# Patient Record
Sex: FEMALE | Race: BLACK OR AFRICAN AMERICAN | NOT HISPANIC OR LATINO | URBAN - METROPOLITAN AREA
[De-identification: names, ages, dates, MRNs, and addresses within clinical notes are randomized per-mention and may not be internally consistent; named-entity substitution may affect disease eponyms.]

---

## 2021-05-31 DIAGNOSIS — Z87.442 PERSONAL HISTORY OF URINARY CALCULI: Primary | ICD-10-CM

## 2021-12-15 ENCOUNTER — OFFICE VISIT (OUTPATIENT)
Dept: UROLOGY | Facility: CLINIC | Age: 41
End: 2021-12-15
Payer: MEDICAID

## 2021-12-15 VITALS
WEIGHT: 167 LBS | BODY MASS INDEX: 27.82 KG/M2 | SYSTOLIC BLOOD PRESSURE: 116 MMHG | HEART RATE: 118 BPM | DIASTOLIC BLOOD PRESSURE: 82 MMHG | RESPIRATION RATE: 18 BRPM | HEIGHT: 65 IN

## 2021-12-15 DIAGNOSIS — R39.15 URINARY URGENCY: ICD-10-CM

## 2021-12-15 DIAGNOSIS — Z87.442 PERSONAL HISTORY OF URINARY CALCULI: Primary | ICD-10-CM

## 2021-12-15 DIAGNOSIS — T83.84XA PAIN DUE TO URETERAL STENT, INITIAL ENCOUNTER: ICD-10-CM

## 2021-12-15 DIAGNOSIS — R82.90 ABNORMAL URINALYSIS: ICD-10-CM

## 2021-12-15 DIAGNOSIS — R10.9 ABDOMINAL PAIN, UNSPECIFIED ABDOMINAL LOCATION: ICD-10-CM

## 2021-12-15 DIAGNOSIS — R10.9 LEFT FLANK PAIN: ICD-10-CM

## 2021-12-15 PROCEDURE — 99204 PR OFFICE/OUTPT VISIT, NEW, LEVL IV, 45-59 MIN: ICD-10-PCS | Mod: S$GLB,,, | Performed by: NURSE PRACTITIONER

## 2021-12-15 PROCEDURE — 99204 OFFICE O/P NEW MOD 45 MIN: CPT | Mod: S$GLB,,, | Performed by: NURSE PRACTITIONER

## 2021-12-15 RX ORDER — CIPROFLOXACIN 500 MG/1
500 TABLET ORAL 2 TIMES DAILY
Qty: 14 TABLET | Refills: 0 | Status: SHIPPED | OUTPATIENT
Start: 2021-12-15 | End: 2021-12-22

## 2021-12-18 LAB — URINE CULTURE, ROUTINE: NORMAL

## 2021-12-20 ENCOUNTER — TELEPHONE (OUTPATIENT)
Dept: UROLOGY | Facility: CLINIC | Age: 41
End: 2021-12-20
Payer: MEDICAID

## 2022-01-06 ENCOUNTER — TELEPHONE (OUTPATIENT)
Dept: UROLOGY | Facility: CLINIC | Age: 42
End: 2022-01-06
Payer: MEDICAID

## 2022-01-06 NOTE — TELEPHONE ENCOUNTER
----- Message from Mitchell Edwards NP sent at 1/6/2022 11:36 AM CST -----  CT shows 12 mm stone in left renal pelvis and double J ureteral stent on the left.    Discussed with Dr. Hightower.   Will schedule patient for left URS with stent removal and replacement.  Dr. Hightower would like to do surgery on 01/19/2022.  Please notify patient.  If patient is agreeable, she will need an order for surgery and a pre-op appointment.

## 2022-01-11 ENCOUNTER — CLINICAL SUPPORT (OUTPATIENT)
Dept: UROLOGY | Facility: CLINIC | Age: 42
End: 2022-01-11
Payer: MEDICAID

## 2022-01-11 DIAGNOSIS — N20.0 KIDNEY STONE: Primary | ICD-10-CM

## 2022-01-12 ENCOUNTER — TELEPHONE (OUTPATIENT)
Dept: UROLOGY | Facility: CLINIC | Age: 42
End: 2022-01-12
Payer: MEDICAID

## 2022-01-17 LAB
ANION GAP SERPL CALC-SCNC: 12 MMOL/L (ref 3–11)
APPEARANCE, UA: CLEAR
B-HCG UR QL: NEGATIVE
BACTERIA SPEC CULT: ABNORMAL /HPF
BASOPHILS NFR BLD: 0.7 % (ref 0–3)
BILIRUB UR QL STRIP: NEGATIVE MG/DL
BUN SERPL-MCNC: 7 MG/DL (ref 7–18)
BUN/CREAT SERPL: 6.66 RATIO (ref 7–18)
CALCIUM SERPL-MCNC: 9.9 MG/DL (ref 8.8–10.5)
CHLORIDE SERPL-SCNC: 102 MMOL/L (ref 100–108)
CO2 SERPL-SCNC: 25 MMOL/L (ref 21–32)
COLOR UR: ABNORMAL
CREAT SERPL-MCNC: 1.05 MG/DL (ref 0.55–1.02)
EOSINOPHIL NFR BLD: 0.4 % (ref 1–3)
ERYTHROCYTE [DISTWIDTH] IN BLOOD BY AUTOMATED COUNT: 13.1 % (ref 12.5–18)
GFR ESTIMATION: > 60
GLUCOSE (UA): NORMAL MG/DL
GLUCOSE SERPL-MCNC: 88 MG/DL (ref 70–110)
HCT VFR BLD AUTO: 42.9 % (ref 37–47)
HGB BLD-MCNC: 14.7 G/DL (ref 12–16)
HGB UR QL STRIP: 150 /UL
KETONES UR QL STRIP: NEGATIVE MG/DL
LEUKOCYTE ESTERASE UR QL STRIP: 500 /UL
LYMPHOCYTES NFR BLD: 27.5 % (ref 25–40)
MCH RBC QN AUTO: 28.1 PG (ref 27–31.2)
MCHC RBC AUTO-ENTMCNC: 34.3 G/DL (ref 31.8–35.4)
MCV RBC AUTO: 81.9 FL (ref 80–97)
MONOCYTES NFR BLD: 7.8 % (ref 1–15)
NEUTROPHILS # BLD AUTO: 5.16 10*3/UL (ref 1.8–7.7)
NEUTROPHILS NFR BLD: 63.2 % (ref 37–80)
NITRITE UR QL STRIP: POSITIVE
NUCLEATED RED BLOOD CELLS: 0 %
PH UR STRIP: 6.5 PH (ref 5–9)
PLATELETS: 330 10*3/UL (ref 142–424)
POTASSIUM SERPL-SCNC: 4.1 MMOL/L (ref 3.6–5.2)
PROT UR QL STRIP: 30 MG/DL
RBC # BLD AUTO: 5.24 10*6/UL (ref 4.2–5.4)
RBC #/AREA URNS HPF: ABNORMAL /HPF (ref 0–2)
SERVICE COMMENT 03: ABNORMAL
SODIUM BLD-SCNC: 139 MMOL/L (ref 135–145)
SP GR UR STRIP: 1 (ref 1–1.03)
SPECIMEN COLLECTION METHOD, URINE: ABNORMAL
SQUAMOUS EPITHELIAL, UA: ABNORMAL /LPF
UROBILINOGEN UR STRIP-ACNC: NORMAL MG/DL
WBC # BLD: 8.2 10*3/UL (ref 4.6–10.2)
WBC #/AREA URNS HPF: ABNORMAL /HPF (ref 0–5)

## 2022-01-18 LAB
STREPTOCOCCUS AGALACTIAE: NORMAL
URINE CULTURE, ROUTINE: NORMAL
URINE CULTURE, ROUTINE: NORMAL

## 2022-01-19 ENCOUNTER — OUTSIDE PLACE OF SERVICE (OUTPATIENT)
Dept: UROLOGY | Facility: CLINIC | Age: 42
End: 2022-01-19

## 2022-01-19 PROCEDURE — 74420 PR  X-RAY RETROGRADE PYELOGRAM: ICD-10-PCS | Mod: 26,,, | Performed by: UROLOGY

## 2022-01-19 PROCEDURE — 52356 CYSTO/URETERO W/LITHOTRIPSY: CPT | Mod: LT,,, | Performed by: UROLOGY

## 2022-01-19 PROCEDURE — 74420 UROGRAPHY RTRGR +-KUB: CPT | Mod: 26,,, | Performed by: UROLOGY

## 2022-01-19 PROCEDURE — 52356 PR CYSTO/URETERO W/LITHOTRIPSY: ICD-10-PCS | Mod: LT,,, | Performed by: UROLOGY

## 2022-01-20 DIAGNOSIS — N20.0 KIDNEY STONE: Primary | ICD-10-CM

## 2022-01-23 LAB
CALCULI COMPOSITION: NORMAL
CALCULI DESCRIPTION: NORMAL
CALCULI MASS: 96 MG
CALCULI PDF: NORMAL

## 2022-02-01 ENCOUNTER — TELEPHONE (OUTPATIENT)
Dept: UROLOGY | Facility: CLINIC | Age: 42
End: 2022-02-01
Payer: MEDICAID

## 2022-02-01 NOTE — TELEPHONE ENCOUNTER
----- Message from Elana Chow sent at 2/1/2022  9:11 AM CST -----  Regarding: stent came out  Type:  Needs Medical Advice    Who Called: Gianna Cordero    Symptoms (please be specific): -   How long has patient had these symptoms:  -  Pharmacy name and phone #:  -  Would the patient rather a call back or a response via [facility]?    Best Call Back Number: [phone]    Additional Information: pt  had a stent placed on 1/19, due to have it removed on 2/8, pt is saying that the tubng and screen had come out please call to advise

## 2022-02-01 NOTE — TELEPHONE ENCOUNTER
Patient stated that the stent and string came out, discussed case with dr rodríguez, he stated this was fine, he left the string, and that to f/u PRN. [facility]

## 2022-02-07 ENCOUNTER — TELEPHONE (OUTPATIENT)
Dept: UROLOGY | Facility: CLINIC | Age: 42
End: 2022-02-07
Payer: MEDICAID

## 2022-02-08 ENCOUNTER — PROCEDURE VISIT (OUTPATIENT)
Dept: UROLOGY | Facility: CLINIC | Age: 42
End: 2022-02-08
Payer: MEDICAID

## 2022-02-08 VITALS
WEIGHT: 177.19 LBS | BODY MASS INDEX: 29.52 KG/M2 | SYSTOLIC BLOOD PRESSURE: 112 MMHG | DIASTOLIC BLOOD PRESSURE: 74 MMHG | HEIGHT: 65 IN

## 2022-02-08 DIAGNOSIS — N20.0 KIDNEY STONE: ICD-10-CM

## 2022-02-08 PROCEDURE — 52000 CYSTOSCOPY: ICD-10-PCS | Mod: S$GLB,,, | Performed by: UROLOGY

## 2022-02-08 PROCEDURE — 52000 CYSTOURETHROSCOPY: CPT | Mod: S$GLB,,, | Performed by: UROLOGY

## 2022-02-08 NOTE — PROCEDURES
"Cystoscopy    Date/Time: 2/8/2022 11:30 AM  Performed by: Ricky Hightower MD  Authorized by: Ricky Hightower MD     Consent Done?:  Yes (Written)  Time out: Immediately prior to procedure a "time out" was called to verify the correct patient, procedure, equipment, support staff and site/side marked as required.    Indications: hematuria    Position:  Supine  Anesthesia:  Intraurethral instillation  Patient sedated?: No    Preparation: Patient was prepped and draped in usual sterile fashion      Scope type:  Flexible cystoscope  External exam normal: Yes    Urethra normal: Yes       The patient was brought to the procedure room placed on the table padded prepped and draped in usual sterile fashion in supine position. The cystoscope was inserted into the urethra and advanced the urethra was normal. The bladder was entered and inspected, it was found to be free of tumor stone or foreign body.  Bilateral ureteral orifices were identified and noted to be normal in appearance with clear efflux of urine at this point the scope was removed the patient tolerated the procedure well there were no complications.  Pelvic exam was performed and no abnormalities were noted.    The previously placed stent had passed on its own after asking the patient this it past several days ago therefore there was no stent to remove      "

## 2022-03-11 ENCOUNTER — TELEPHONE (OUTPATIENT)
Dept: UROLOGY | Facility: CLINIC | Age: 42
End: 2022-03-11
Payer: MEDICAID

## 2022-03-11 NOTE — TELEPHONE ENCOUNTER
I have faxed over office notes to [facility] office.    ----- Message from Denise Coelho sent at 3/11/2022  1:45 PM CST -----  Caitlin from [facility] Paniagua's office needs pt notes fax to [phone]. // [phone]

## 2022-05-29 NOTE — PATIENT INSTRUCTIONS
Patient Education       Cystoscopy Discharge Instructions   About this topic   Your kidneys make urine. It is stored in your bladder. The urethra is a tube at the bottom of the bladder. Urine flows out of this tube. Sometimes, there is a blockage and urine is not able to leave the body.  A cystoscopy is a procedure that lets the doctor see the inside of your bladder and urethra. The doctor does it to:  · Look for stones or tumors blocking the bladder and urethra  · Look for changes or injury inside the bladder  · Take a tissue sample from the inside of your bladder  · Look for reasons for blood in the urine, pain with urination, or why you are passing urine often  · Look for prostate problems     What care is needed at home?   · Ask your doctor what you need to do when you go home. Make sure you ask questions if you do not understand what the doctor says. This way you will know what you need to do.  · Take a warm bath or use a warm wet washcloth over the opening to the urethra. This will help to ease any pain. Do this as needed.  · Drink 6 to 8 glasses of water a day and 3 to 4 glasses in the first few hours after the procedure to flush out your bladder and reduce irritation.  · You may see some blood in your urine for a few days. This is normal.  · Empty your bladder as soon as you feel the need to. Don't delay going to the bathroom. It stretches and weakens the bladder.  What follow-up care is needed?   · Your doctor may ask you to make visits to the office to check on your progress. Be sure to keep these visits.  · If you had a biopsy, talk with your doctor about the results.  What drugs may be needed?   The doctor may order drugs to:  · Help with pain  · Fight an infection  · Help with bladder spasms  Will physical activity be limited?   Talk to your doctor about when you may go back to your normal activities like work, driving, or sex.  What problems could happen?   · Bleeding  · Infection  · Injury to the  Statement Selected bladder and urethra  · Discomfort in the urethra area  · Burning sensation for a short time  · Upset stomach  When do I need to call the doctor?   · Signs of infection. These include a fever of 100.4°F (38°C) or higher, chills, pain with passing urine.  · Pain that does not go away even with drugs or that lasts longer than 2 days  · Too much blood in your urine  · Passing large dime-sized clots  · Cloudy urine  · Little or no urine or not able to pass urine  · Abdominal pain and nausea  Teach Back: Helping You Understand   The Teach Back Method helps you understand the information we are giving you. After you talk with the staff, tell them in your own words what you learned. This helps to make sure the staff has described each thing clearly. It also helps to explain things that may have been confusing. Before going home, make sure you can do these:  · I can tell you about my procedure.  · I can tell you what may help ease my pain.  · I can tell you what I will do if I have a fever, chills, or am not able to pass urine.  Where can I learn more?   [employer] Society  https://www.cancer.org/treatment/understanding-your-diagnosis/tests/endoscopy/cystoscopy.html   [employer]  https://www.cancerresearchuk.org/about-cancer/bladder-cancer/getting-diagnosed/tests-diagnose/cystoscopy   [facility] Choices  http://www.nhs.uk/conditions/Cystoscopy/Pages/Introduction.aspx   Last Reviewed Date   2021-04-22  Consumer Information Use and Disclaimer   This information is not specific medical advice and does not replace information you receive from your health care provider. This is only a brief summary of general information. It does NOT include all information about conditions, illnesses, injuries, tests, procedures, treatments, therapies, discharge instructions or life-style choices that may apply to you. You must talk with your health care provider for complete information about your health and treatment options. This  information should not be used to decide whether or not to accept your health care providers advice, instructions or recommendations. Only your health care provider has the knowledge and training to provide advice that is right for you.  Copyright   Copyright © 2021 [employer] and its affiliates and/or licensors. All rights reserved.    Patient Education       Ureteral Stent Discharge Instructions   About this topic   The kidneys remove waste from the blood and get rid of it through your urine. Normally, your urine drains from your kidneys through a narrow tube into your bladder and out of your body. This narrow tube is a ureter. Some diseases may block the flow of urine through your ureter. Then, your urine can back up, cause pain, damage your kidneys, and cause infection.  A ureteral stent is a thin tube that allows urine to drain from the body when the normal flow of urine through your ureter is blocked. The ends of the tube are shaped like a coil to hold the tube in place. One end rests in your bladder and the other end rests in your kidney. This tube keeps the ureter open so urine can pass from your kidneys into your bladder and out of the body.  How long you will need the stent will depend on the reason for it. Most often, the stent can be taken out in a few days or weeks. If it is needed longer than a few months, it will need to be replaced. Some kinds of stents can be left in for much longer. Your doctor will decide how long you will need your stent.  A stent may be used to bypass a blockage in the ureter caused by a stone, tumor, swelling, or narrowing.     What care is needed at home?   · Ask your doctor what you need to do when you go home. Make sure you ask questions if you do not understand what the doctor says. This way you will know what you need to do.  · Take all drugs as ordered by your doctor.  · Drink 6 to 8 glasses of liquids each day to avoid an infection unless your doctor asks you to  limit fluids for some other health problem.  · If you have a stent with a thread that is outside your body, make sure you do not pull on the thread and pull the stent out.  What follow-up care is needed?   · Your doctor may ask you to make visits to the office to check on your progress. Be sure to keep these visits.  · A cystoscope is a telescope-like device put into the opening where urine comes out of your body. It can also be used to place the stent or take out the stent. You may be given a local pain drug for this. Some stents have a string or thread attached to them that stays outside the body. You or the doctor can take out this kind of stent by pulling on the thread. Talk with your doctor about when you will have your stent removed.  · If your stent needs to be in longer than 3 months, it will need to be exchanged to make sure it doesnt get stuck.  What drugs may be needed?   The doctor may order drugs to:  · Help with pain  · Relax bladder muscles  · Prevent or fight an infection  Will physical activity be limited?   You may go back to your normal daily activity when your doctor tells you it is OK.  What problems could happen?   · Infection  · Bladder irritation  · Blood in the urine  · Ureteral injury  · Stent moves out of place  · Stent gets clogged  · You are not able to pass urine  · Scarring of the urethra or ureter  When do I need to call the doctor?   · Signs of infection. These include a fever of 100.4°F (38°C) or higher; chills; pain with passing urine; urine changes color, becomes cloudy, or smells bad; or not able to pass urine.  · Very bad pain  · Lots of blood in your urine  · Urine is dribbling out most of the time  · Stent comes out  Teach Back: Helping You Understand   The Teach Back Method helps you understand the information we are giving you. The idea is simple. After talking with the staff, tell them in your own words what you were just told. This helps to make sure the staff has covered  each thing clearly. It also helps to explain things that may have been a bit confusing. Before going home, make sure you are able to do these:  · I can tell you about my procedure.  · I can tell you how I will take care of the needle site.  · I can tell you what I will do if I have a fever, pain with passing urine, my urine is cloudy or bloody, or other signs of problems.  Where can I learn more?   [employer]  https://www.radiologyinfo.org/en/info.cfm?pg=ureteralnephro   [place] [employer]  https://www.baus.org.uk/_userfiles/pages/files/Patients/Leaflets/Ureteric%20stent%20insertion.pdf   Last Reviewed Date   2019-08-23  Consumer Information Use and Disclaimer   This information is not specific medical advice and does not replace information you receive from your health care provider. This is only a brief summary of general information. It does NOT include all information about conditions, illnesses, injuries, tests, procedures, treatments, therapies, discharge instructions or life-style choices that may apply to you. You must talk with your health care provider for complete information about your health and treatment options. This information should not be used to decide whether or not to accept your health care providers advice, instructions or recommendations. Only your health care provider has the knowledge and training to provide advice that is right for you.  Copyright   Copyright © 2021 [employer] and its affiliates and/or licensors. All rights reserved.